# Patient Record
Sex: FEMALE | Race: WHITE
[De-identification: names, ages, dates, MRNs, and addresses within clinical notes are randomized per-mention and may not be internally consistent; named-entity substitution may affect disease eponyms.]

---

## 2019-01-05 ENCOUNTER — HOSPITAL ENCOUNTER (EMERGENCY)
Dept: HOSPITAL 59 - ER | Age: 34
Discharge: HOME | End: 2019-01-05
Payer: SELF-PAY

## 2019-01-05 DIAGNOSIS — F17.210: ICD-10-CM

## 2019-01-05 DIAGNOSIS — R06.02: ICD-10-CM

## 2019-01-05 DIAGNOSIS — J45.21: Primary | ICD-10-CM

## 2019-01-05 PROCEDURE — 99283 EMERGENCY DEPT VISIT LOW MDM: CPT

## 2019-01-05 PROCEDURE — 99284 EMERGENCY DEPT VISIT MOD MDM: CPT

## 2019-01-05 PROCEDURE — 71046 X-RAY EXAM CHEST 2 VIEWS: CPT

## 2019-01-05 PROCEDURE — 94640 AIRWAY INHALATION TREATMENT: CPT

## 2019-01-05 NOTE — EMERGENCY DEPARTMENT RECORD
History of Present Illness





- General


Chief Complaint: Shortness of breath


Stated Complaint: PAO


Time Seen by Provider: 19 10:18


Source: Patient


Mode of Arrival: Ambulatory


Limitations: No limitations





- History of Present Illness


Initial Comments: 


The patient is here due to a 2-3 weeks hx of cough, congestion, and SOB. She 

does have a hx of asthma and this illness does feel typical for one of her 

asthma exacerbations. She denies any fever, CP, ST, or colored sputum. The 

patient states she usually needs to be placed on Prednisone and it does 

improve. She has no hx of being admitted to the hospital for Asthma.





MD Complaint: "Asthma attack", Cough, Shortness of breath


Onset/Timin


-: Week(s)


Known History Of: Asthma


Associated Symptoms: Cough





- Related Data


Home Oxygen Therapy: No


 Previous Rx's











 Medication  Instructions  Recorded


 


Albuterol Sulfate 0.083% [Neb] 3 ml NEB .EVERY 4-6 HOURS PRN #1 ml 19





[Albuterol Sulfate]  


 


Albuterol Sulfate [Proair Hfa] 2 puff IH QID PRN #1 inhaler 19


 


Prednisone [Prednisone 20Mg] 20 mg PO ASDIR #15 tab 19











 Allergies











Allergy/AdvReac Type Severity Reaction Status Date / Time


 


No Known Drug Allergies Allergy   Verified 03/06/15 21:30














Travel Screening





- Travel/Exposure Within Last 30 Days


Have you traveled within the last 30 days?: No





Review of Systems


Constitutional: Denies: Chills, Fever


Eyes: Denies: Eye discharge


ENT: Denies: Congestion


Respiratory: Reports: Cough, Dyspnea, Wheezes.  Denies: Hemoptysis, Stridor


Cardiovascular: Denies: Arrhythmia


Endocrine: Denies: Fatigue


Gastrointestinal: Denies: Nausea


Genitourinary: Denies: Dysuria


Musculoskeletal: Denies: Arthralgia





Past Medical History





- SOCIAL HISTORY


Smoking Status: Former smoker


Alcohol Use: None


Drug Use: None





- RESPIRATORY


Hx Respiratory Disorders: Yes


Hx Asthma: Yes (allergy induced)


Comment:: seasonal allergies





- CARDIOVASCULAR


Hx Cardio Disorders: No





- NEURO


Hx Neuro Disorders: No





- GI


Hx GI Disorders: No





- 


Hx Genitourinary Disorders: No





- ENDOCRINE


Hx Endocrine Disorders: No





- MUSCULOSKELETAL


Hx Musculoskeletal Disorders: No





- PSYCH


Hx Psych Problems: No





- HEMATOLOGY/ONCOLOGY


Hx Hematology/Oncology Disorders: No





Family Medical History


Any Significant Family History?: Yes


Hx Cancer: Grandparents


Hx HTN: Mother





Physical Exam





- General


General Appearance: Alert, Oriented x3, Cooperative, No acute distress





- Head


Head exam: Atraumatic, Normocephalic, Normal inspection





- Eye


Eye exam: Normal appearance, PERRL, EOMI





- ENT


Throat exam: Normal inspection.  negative: Tonsillar erythema, Tonsillar exudate





- Neck


Neck exam: Normal inspection, Full ROM.  negative: Tenderness





- Respiratory


Respiratory exam: Decreased breath sounds, Wheezes.  negative: Normal lung 

sounds bilaterally, Accessory muscle use, Respiratory distress, Rhonchi





- Cardiovascular


Cardiovascular Exam: Regular rate, Normal rhythm, Normal heart sounds





- GI/Abdominal


GI/Abdominal exam: Soft, Normal bowel sounds.  negative: Tenderness





- Extremities


Extremities exam: Normal inspection





- Neurological


Neurological exam: Alert.  negative: Motor sensory deficit





Course





 Vital Signs











  19





  10:04


 


Temperature 98.1 F


 


Pulse Rate 99 H


 


Respiratory 24





Rate 


 


Blood Pressure 118/88


 


Pulse Ox 93 L














- Reevaluation(s)


Reevaluation #1: 


The patient is doing better at this time. She is breathing much better and is 

ready for home. I did discuss the neg xray with the patient and the need for F/

U if not better in 3 days.


19 10:53








Medical Decision Making





- Data Complexity


MDM Data: X-Ray Ordered and/or Reviewed





- Radiology Data


Radiology results: Report reviewed (CXR: Neg)





Disposition


Disposition: Discharge


Clinical Impression: 


Asthma attack


Qualifiers:


 Asthma severity: mild Asthma persistence: intermittent Qualified Code(s): 

J45.21 - Mild intermittent asthma with (acute) exacerbation





Disposition: Home, Self-Care


Condition: (2) Stable


Instructions:  Asthma (ED)


Additional Instructions: 


Please continue your home Nebulizer and continue the Prednisone tomorrow. 

Please see your family doctor in 3 days if not better and return to the ER for 

any worsening symptoms.


Prescriptions: 


Albuterol Sulfate [Proair Hfa] 2 puff IH QID PRN #1 inhaler


 PRN Reason: Cough And Difficulty Breathing


Albuterol Sulfate 0.083% [Neb] [Albuterol Sulfate] 3 ml NEB .EVERY 4-6 HOURS 

PRN #1 ml


 PRN Reason: Difficulty In Breathing


Prednisone [Prednisone 20Mg] 20 mg PO ASDIR #15 tab


Forms:  Patient Portal Access


Time of Disposition: 10:57





Quality





- Quality Measures


Quality Measures: N/A





- Blood Pressure Screening


View Details: Yes


Does Patient Have Any of the Following: No


Blood Pressure Classification: Pre-Hypertensive BP Reading


Systolic Measurement: 118


Diastolic Measurement: 88


Screening for High Blood Pressure: < Pre-Hypertensive BP, F/U Documented > [

]


Pre-Hypertensive Follow-up Interventions: Referral to alternative/primary care 

provider.

## 2019-01-06 NOTE — RADIOLOGY REPORT
EXAM:  CHEST 2 VIEWS



HISTORY:  SHORTNESS OF BREATH AND COUGH FOR TWO WEEKS.



TECHNIQUE:  Two views of the chest.



COMPARISON:  None.



FINDINGS:  Cardiac silhouette within normal size limits. No focal pulmonary 
consolidation. No pleural effusion or pneumothorax. 



IMPRESSION:  NO ACUTE LUNG FINDINGS. 



JOB NUMBER:  [id number]
MTDD